# Patient Record
Sex: FEMALE | Race: ASIAN | NOT HISPANIC OR LATINO | URBAN - METROPOLITAN AREA
[De-identification: names, ages, dates, MRNs, and addresses within clinical notes are randomized per-mention and may not be internally consistent; named-entity substitution may affect disease eponyms.]

---

## 2020-09-20 ENCOUNTER — EMERGENCY (EMERGENCY)
Facility: HOSPITAL | Age: 30
LOS: 0 days | Discharge: HOME | End: 2020-09-20
Attending: EMERGENCY MEDICINE | Admitting: EMERGENCY MEDICINE
Payer: MEDICARE

## 2020-09-20 VITALS
TEMPERATURE: 98 F | RESPIRATION RATE: 19 BRPM | WEIGHT: 169.98 LBS | HEART RATE: 86 BPM | DIASTOLIC BLOOD PRESSURE: 82 MMHG | SYSTOLIC BLOOD PRESSURE: 110 MMHG | OXYGEN SATURATION: 100 %

## 2020-09-20 VITALS
OXYGEN SATURATION: 100 % | RESPIRATION RATE: 18 BRPM | SYSTOLIC BLOOD PRESSURE: 123 MMHG | HEART RATE: 76 BPM | TEMPERATURE: 98 F | DIASTOLIC BLOOD PRESSURE: 74 MMHG

## 2020-09-20 DIAGNOSIS — M25.519 PAIN IN UNSPECIFIED SHOULDER: ICD-10-CM

## 2020-09-20 DIAGNOSIS — M62.830 MUSCLE SPASM OF BACK: ICD-10-CM

## 2020-09-20 DIAGNOSIS — M54.9 DORSALGIA, UNSPECIFIED: ICD-10-CM

## 2020-09-20 PROCEDURE — 73030 X-RAY EXAM OF SHOULDER: CPT | Mod: 26,LT

## 2020-09-20 PROCEDURE — 71046 X-RAY EXAM CHEST 2 VIEWS: CPT | Mod: 26

## 2020-09-20 PROCEDURE — 99284 EMERGENCY DEPT VISIT MOD MDM: CPT

## 2020-09-20 RX ORDER — METHOCARBAMOL 500 MG/1
2 TABLET, FILM COATED ORAL
Qty: 24 | Refills: 0
Start: 2020-09-20 | End: 2020-09-23

## 2020-09-20 RX ORDER — KETOROLAC TROMETHAMINE 30 MG/ML
30 SYRINGE (ML) INJECTION ONCE
Refills: 0 | Status: DISCONTINUED | OUTPATIENT
Start: 2020-09-20 | End: 2020-09-20

## 2020-09-20 RX ORDER — OXYCODONE AND ACETAMINOPHEN 5; 325 MG/1; MG/1
1 TABLET ORAL ONCE
Refills: 0 | Status: DISCONTINUED | OUTPATIENT
Start: 2020-09-20 | End: 2020-09-20

## 2020-09-20 RX ORDER — METHOCARBAMOL 500 MG/1
1000 TABLET, FILM COATED ORAL ONCE
Refills: 0 | Status: COMPLETED | OUTPATIENT
Start: 2020-09-20 | End: 2020-09-20

## 2020-09-20 RX ADMIN — OXYCODONE AND ACETAMINOPHEN 1 TABLET(S): 5; 325 TABLET ORAL at 12:59

## 2020-09-20 RX ADMIN — METHOCARBAMOL 1000 MILLIGRAM(S): 500 TABLET, FILM COATED ORAL at 11:14

## 2020-09-20 RX ADMIN — Medication 30 MILLIGRAM(S): at 11:45

## 2020-09-20 RX ADMIN — Medication 30 MILLIGRAM(S): at 11:14

## 2020-09-20 NOTE — ED PROVIDER NOTE - NSFOLLOWUPINSTRUCTIONS_ED_ALL_ED_FT
Muscle Cramps and Spasms    Muscle cramps and spasms occur when a muscle or muscles tighten and you have no control over this tightening (involuntary muscle contraction). They are a common problem and can develop in any muscle. The most common place is in the calf muscles of the leg. Both muscle cramps and muscle spasms are involuntary muscle contractions, but they also have differences:     Muscle cramps are sporadic and painful. They may last a few seconds to a quarter of an hour. Muscle cramps are often more forceful and last longer than muscle spasms.  Muscle spasms may or may not be painful. They may also last just a few seconds or much longer.     CAUSES  It is uncommon for cramps or spasms to be due to a serious underlying problem. In many cases, the cause of cramps or spasms is unknown. Some common causes are:     Overexertion.    Overuse from repetitive motions (doing the same thing over and over).    Remaining in a certain position for a long period of time.    Improper preparation, form, or technique while performing a sport or activity.    Dehydration.    Injury.    Side effects of some medicines.    Abnormally low levels of the salts and ions in your blood (electrolytes), especially potassium and calcium. This could happen if you are taking water pills (diuretics) or you are pregnant.       Some underlying medical problems can make it more likely to develop cramps or spasms. These include, but are not limited to:     Diabetes.    Parkinson disease.    Hormone disorders, such as thyroid problems.    Alcohol abuse.    Diseases specific to muscles, joints, and bones.    Blood vessel disease where not enough blood is getting to the muscles.       HOME CARE INSTRUCTIONS  Stay well hydrated. Drink enough water and fluids to keep your urine clear or pale yellow.  It may be helpful to massage, stretch, and relax the affected muscle.  For tight or tense muscles, use a warm towel, heating pad, or hot shower water directed to the affected area.  If you are sore or have pain after a cramp or spasm, applying ice to the affected area may relieve discomfort.  Put ice in a plastic bag.  Place a towel between your skin and the bag.  Leave the ice on for _____________________ minutes, _____________________ times a day.  Medicines used to treat a known cause of cramps or spasms may help reduce their frequency or severity. Only take over-the-counter or prescription medicines as directed by your caregiver.     SEEK MEDICAL CARE IF:  Your cramps or spasms get more severe, more frequent, or do not improve over time.     MAKE SURE YOU:  Understand these instructions.   Will watch your condition.  Will get help right away if you are not doing well or get worse.    ADDITIONAL NOTES AND INSTRUCTIONS    Please follow up with your Primary MD in 24-48 hr.  Seek immediate medical care for any new/worsening signs or symptoms.     Follow up with your primary medical doctor in 1-2 days

## 2020-09-20 NOTE — ED PROVIDER NOTE - CARE PROVIDER_API CALL
Calin Means  ORTHOPAEDIC SURGERY  [address] [place] [address]  Phone: [phone]  Fax: [phone]  Follow Up Time: 1-3 Days

## 2020-09-20 NOTE — ED PROVIDER NOTE - NSFOLLOWUPCLINICS_GEN_ALL_ED_FT
[facility] ([facility])  Rehabilitation  [address] [place] [address]  Phone: [phone]  Fax:   Follow Up Time: 1-3 Days

## 2020-09-20 NOTE — ED PROVIDER NOTE - PATIENT PORTAL LINK FT
You can access the FollowMyHealth Patient Portal offered by [facility] by registering at the following website: [email or web address]. By joining [employer]’s FollowMyHealth portal, you will also be able to view your health information using other applications (apps) compatible with our system.

## 2020-09-20 NOTE — ED PROVIDER NOTE - CLINICAL SUMMARY MEDICAL DECISION MAKING FREE TEXT BOX
Left shoulder strain. Left shoulder strain. x-ray neg. Advised rest. Pt is a  and is doing tele teaching. Naprosyn BID advised.

## 2020-09-20 NOTE — ED PROVIDER NOTE - PHYSICAL EXAMINATION
CONST: Well appearing in NAD  EYES:  Sclera and conjunctiva clear.  NECK: Non-tender, no meningeal signs, supple  CARD: Normal S1 S2; Normal rate and rhythm  RESP: Equal BS B/L, No wheezes, rhonchi or rales. No distress  GI: Soft, non-tender, non-distended.  MS: mild TTP L trapezius,  + spasm, no pain with ROM L shoulder, Normal ROM in all extremities. No edema of lower extremities, no calf pain, radial pulses 2+ bilaterally  SKIN: Warm, dry, no acute rashes. Good turgor  NEURO: A&Ox3, No focal deficits. Strength 5/5 with no sensory deficits. Steady gait

## 2020-09-20 NOTE — ED PROVIDER NOTE - ATTENDING CONTRIBUTION TO CARE
Pt reports she woke up with pain to the left shoulder area. Pain on movement. No injury. On exam S1S2 rrr, lungs clear, no rash of back or shoulder, + tenderness left shoulder, trapezius, parascapular area. Pain on range of motion.

## 2020-09-20 NOTE — ED PROVIDER NOTE - NS ED ROS FT
CONST: No fever, chills or bodyaches  EYES: No pain, redness, drainage or visual changes.  ENT: No ear pain or discharge, nasal discharge or congestion. No sore throat  CARD: No chest pain, palpitations  RESP: No SOB, cough  GI: No abdominal pain, N/V/D  MS: + back pain. No joint pain, or extremity pain/injury  SKIN: No rashes  NEURO: No headache, dizziness, paresthesias

## 2020-09-20 NOTE — ED PROVIDER NOTE - OBJECTIVE STATEMENT
30 y.o female w/ no sig pmx presents to the ED for evaluation of muscle spasm.  States she turned wrong in bed and developed acute pain L trapezius muscle pain 30 y.o female w/ no sig pmx presents to the ED for evaluation of muscle spasm.  States she turned wrong in bed and developed acute pain L trapezius muscle pain.  Acute, throbbing, worse w/ movement of L arm, mild severity, no radiation of pain.  No chest pain, dyspnea, fever, chills, cough, paresthesias.

## 2020-09-20 NOTE — ED ADULT NURSE NOTE - NSIMPLEMENTINTERV_GEN_ALL_ED
Implemented All Universal Safety Interventions:  [place] to call system. Call bell, personal items and telephone within reach. Instruct patient to call for assistance. Room bathroom lighting operational. Non-slip footwear when patient is off stretcher. Physically safe environment: no spills, clutter or unnecessary equipment. Stretcher in lowest position, wheels locked, appropriate side rails in place.

## 2022-03-22 ENCOUNTER — EMERGENCY (EMERGENCY)
Facility: HOSPITAL | Age: 32
LOS: 0 days | Discharge: HOME | End: 2022-03-22
Attending: EMERGENCY MEDICINE | Admitting: EMERGENCY MEDICINE
Payer: MEDICARE

## 2022-03-22 VITALS
RESPIRATION RATE: 18 BRPM | OXYGEN SATURATION: 99 % | TEMPERATURE: 97 F | HEART RATE: 100 BPM | DIASTOLIC BLOOD PRESSURE: 83 MMHG | SYSTOLIC BLOOD PRESSURE: 139 MMHG

## 2022-03-22 VITALS — WEIGHT: 175.05 LBS

## 2022-03-22 DIAGNOSIS — M79.672 PAIN IN LEFT FOOT: ICD-10-CM

## 2022-03-22 PROCEDURE — 73630 X-RAY EXAM OF FOOT: CPT | Mod: 26,LT

## 2022-03-22 PROCEDURE — 99283 EMERGENCY DEPT VISIT LOW MDM: CPT

## 2022-03-22 RX ORDER — IBUPROFEN 200 MG
1 TABLET ORAL
Qty: 12 | Refills: 0
Start: 2022-03-22 | End: 2022-03-24

## 2022-03-22 RX ORDER — IBUPROFEN 200 MG
600 TABLET ORAL ONCE
Refills: 0 | Status: COMPLETED | OUTPATIENT
Start: 2022-03-22 | End: 2022-03-22

## 2022-03-22 RX ADMIN — Medication 600 MILLIGRAM(S): at 18:23

## 2022-03-22 NOTE — ED PROVIDER NOTE - PHYSICAL EXAMINATION
CONSTITUTIONAL: Well-developed; well-nourished; in no acute distress, nontoxic appearing  SKIN: skin exam is warm and dry  CARD: S1, S2 normal, no murmur  RESP: No wheezes, rales or rhonchi. Good air movement bilaterally   EXT: Normal ROM. No bony tenderness or deformity. no skin changes. pulses 2+. steady gait.   NEURO: awake, alert, following commands, oriented, grossly unremarkable. No Focal deficits. GCS 15.   PSYCH: Cooperative, appropriate.

## 2022-03-22 NOTE — ED PROVIDER NOTE - PROVIDER TOKENS
FREE:[LAST:[[facility]],PHONE:[phone]],FAX:[(   [phone]    -],ADDRESS:[[address] [place] [address]],FOLLOWUP:[1-3 Days]]

## 2022-03-22 NOTE — ED PROVIDER NOTE - NS ED ATTENDING STATEMENT MOD
This was a shared visit with the ONI. I reviewed and verified the documentation and independently performed the documented:

## 2022-03-22 NOTE — ED PROVIDER NOTE - CLINICAL SUMMARY MEDICAL DECISION MAKING FREE TEXT BOX
x ray obtained.  no fracture.  pt to cont NSAIDs.  Rec ice.  Pt to keep appt she has with Podiatrist this week. Pt instructed to return if any worsening symptoms or concerns.  They verbalize understanding.

## 2022-03-22 NOTE — ED PROVIDER NOTE - NS ED ROS FT
Review of Systems:  	•	CONSTITUTIONAL: no fever, no diaphoresis, no chills  	•	SKIN: no rash  	•	MUSCULOSKELETAL: +L foot pain, no swelling/redness  	•	NEUROLOGIC: no weakness, no numbness  	•	PSYCH: no anxiety, non suicidal, non homicidal, no hallucination, no depression

## 2022-03-22 NOTE — ED PROVIDER NOTE - ATTENDING CONTRIBUTION TO CARE
32 yo F presents for evaluation of left foot pain worse with ambulation x 1 week.  Pt seen by [facility] and started on Mobic and steroids.  Pt completed steroids and pain is back. no h/o trauma. On exam pt in NAD AAO x 3, + tender left plantar surface, no skin changes, ankle stable,

## 2022-03-22 NOTE — ED PROVIDER NOTE - NSFOLLOWUPINSTRUCTIONS_ED_ALL_ED_FT
Foot Pain    Many things can cause foot pain. Some common causes are:     An injury.  A sprain.  Arthritis.  Blisters.  Bunions.    HOME CARE INSTRUCTIONS  Pay attention to any changes in your symptoms. Take these actions to help with your discomfort:    If directed, put ice on the affected area:  Put ice in a plastic bag.  Place a towel between your skin and the bag.  Leave the ice on for 15–20 minutes, 3?4 times a day for 2 days.  Take over-the-counter and prescription medicines only as told by your health care provider.  Wear comfortable, supportive shoes that fit you well. Do not wear high heels.  Do not stand or walk for long periods of time.  Do not lift a lot of weight. This can put added pressure on your feet.  Do stretches to relieve foot pain and stiffness as told by your health care provider.  Rub your foot gently.  Keep your feet clean and dry.    SEEK MEDICAL CARE IF:  Your pain does not get better after a few days of self-care.  Your pain gets worse.  You cannot stand on your foot.    SEEK IMMEDIATE MEDICAL CARE IF:  Your foot is numb or tingling.  Your foot or toes are swollen.  Your foot or toes turn white or blue.  You have warmth and redness along your foot.    ADDITIONAL NOTES AND INSTRUCTIONS    Please follow up with your Primary MD in 24-48 hr.  Seek immediate medical care for any new/worsening signs or symptoms. OVERVIEW  Plantar fasciitis is one of the most common causes of foot pain in adults. Plantar fasciitis is caused by a strain of the ligaments in an area of the foot called the plantar fascia (figure 1). The plantar fascia (pronounced FASH-uh) is a thick piece of tissue with long fibers that starts at the heel bone and fans out along the under surface of the foot to the toes. The fascia provides support as the toes bear the body's weight when the heel rises during walking. Running, jumping, or standing for long periods of time can strain the plantar fascia.    Most people with plantar fasciitis notice improvement in symptoms with or without conservative treatment such as rest, icing, and stretching. Most people are pain-free within a year.    SYMPTOMS  The most common symptom of plantar fasciitis is pain beneath the heel and sole of the foot. The pain is often worst when stepping onto the foot, particularly when first getting out of bed in the morning or when getting up after being seated for some time. You may have pain in one or both of your feet.    RISK FACTORS  Plantar fasciitis is more likely to occur in people whose lifestyle or occupation causes repetitive impact to the heel. Activities such as running, marching, or dancing may trigger or worsen symptoms. Possible other factors that increase the risk of plantar fasciitis include obesity, prolonged standing, and limited ankle flexibility.    Plantar fasciitis occurs more frequently among runners. Although evidence is limited, possible factors that increase the risk in this group include:    ?Excessive training (particularly a sudden increase in the distance run)    ?Improper running shoes    ?Running on unyielding surfaces    ?Prolonged standing or walking on hard surfaces    ?Flat feet    ?High arches    Plantar fasciitis usually occurs in people without underlying medical problems, although it is more common in people with type 2 diabetes, and it can be associated with other rheumatic disorders such as ankylosing spondylitis or psoriatic arthritis. (See "Patient education: Axial spondyloarthritis, including ankylosing spondylitis (Beyond the Basics)" and "Patient education: Psoriatic arthritis (Beyond the Basics)".)    DIAGNOSIS  To diagnose plantar fasciitis, a health care provider will take a medical history and examine your feet to locate painful areas. This involves holding your foot in a flexed position with one hand, and using the other hand to press on different parts of your sole (where the plantar fascia is located). It is important to tell your provider if you have noticed pain or tenderness in other areas not found during the exam.    If you have typical symptoms of plantar fasciitis, then no X-rays, ultrasound, or other tests are required. This is the case for most people. In some instances, depending upon the nature and severity of pain as well as other individual factors, your provider may recommend X-rays to determine if another issue (such as a fracture) is causing your pain.    TREATMENT    Initial treatment options — Many people with plantar fasciitis require no specific treatment as the condition gets better on its own. While some people might need treatment to manage pain, many commonly used treatments have not been proven to improve the symptoms of plantar fasciitis.    Commonly used treatments for plantar fasciitis include the following:    Rest — Limiting athletic activities and getting extra rest may help to relieve your symptoms. If possible, avoid excessive and repetitive heel impact from jumping, dancing, and distance running. A complete lack of physical activity, however, is not recommended, as this can lead to stiffening and a return of pain.    Icing — Applying ice to the area, for example for 20 minutes up to four times daily, may relieve pain. Ice and massage may also be used before exercise, but their value is unknown.    Stretching — Stretching exercises may be helpful. Home exercises include the calf-plantar fascia stretch (picture 1), foot/ankle circles (picture 2), toe curls (picture 3), and toe towel curls (picture 4). Go slowly and be careful when you start new exercises to avoid causing more pain.    Pain medication — A clinician may recommend a short course of a nonsteroidal antiinflammatory drug (NSAID) such as ibuprofen (sample brand names: Advil, Motrin) or naproxen (sample brand name: Aleve) to relieve pain. However, any benefit might be small, and these medications have many possible side effects. For these reasons, it is important to weigh the potential risks and benefits. Topical NSAIDs (such as creams or gels) applied to the painful area may also be helpful but have not been studied for this condition. (See "Patient education: Nonsteroidal antiinflammatory drugs (NSAIDs) (Beyond the Basics)".)    Protective footwear and orthotics — It may help to wear athletic shoes, arch-supporting shoes (particularly those with an extra-long "counter," which is the firm part of the shoe that surrounds the heel), or shoes with rigid shanks (usually a metal insert in the sole of the shoe). Cushion-soled shoes with gel pad inserts or heel cups may provide temporary pain relief. Silicone inserts have been found to provide better support than felt pads or rubber heel cups. Magnetic insoles have not been found to provide any additional benefit. There may be no difference in benefit between premade orthotics (which can be bought in a pharmacy) and those that are specially made (customized), although the latter are more expensive. There is also growing evidence that suggests that all forms of foot orthoses provide little or no benefit.    People who work or live in buildings with concrete floors should wear shoes with extra cushioning.    Wearing slippers or going barefoot may cause your symptoms to get worse or return, even if your floors are carpeted. Thus, it's a good idea to put on a supportive shoe or sandal even before you step out of bed in the morning.    Tape support — Taping the affected foot with a technique known as "low-Dye taping" may help, particularly if you tend to have pain first thing in the morning. Four strips of tape are applied as illustrated in the figure (picture 5). Do not wrap the tape too tightly. You can use hypoallergenic tape if you have an allergy or sensitivity to regular sports tape.    Other plantar fasciitis treatment options — If these measures fail to improve the pain, your health care provider may recommend trying one of the following:    Steroid injection — An injection of a steroid (also called "glucocorticoid") medication in your foot can help to relieve pain quickly, although the effect may wear off after a few weeks. The doctor will press on your foot in order to locate the tender area and give the injection in that spot. The injection can be repeated if needed when the effects wear off, although many clinicians limit the number of times they will give injections because they believe repeated injections may weaken the tissues of the sole of the foot. However, this belief is unproven.    Steroid injections can be painful and are associated with a very small risk of causing infection.    Casting — Another option is a short walking cast, which begins at the calf and covers the ankle and foot up to the toes. This type of cast has a rocker-shaped bottom that allows you to continue walking while wearing it. This treatment has not been tested in clinical trials.    Surgery — Surgery is rarely required for people with plantar fasciitis, and its efficacy remains unproven. It would only be recommended if all other treatments had failed and there were persistent and disabling symptoms for at least 6 to 12 months. Surgery involves detaching the plantar fascia from the heel bone.    Treatments of no or unproven benefit — Some clinicians recommend other approaches to people who do not improve with the above measures. These approaches are typically more costly. Shockwave therapy and platelet-rich plasma injections have been studied more extensively than some of the other alternative options.    Shockwave therapy — This therapy involves using a special probe to generate sound waves that provide a burst of energy to the sole of the foot. High-quality studies have demonstrated that shockwave therapy provides no benefits over "placebo" treatment (ie, pretend treatment that doesn't really deliver a dose of energy, or delivers a dose too low to have an effect).    Whole blood or platelet-rich plasma injection — This therapy involves injecting a person's own blood, or just the platelet component of it, into the sole of the foot. Low-quality studies indicate these therapies are unlikely to be of benefit; this is consistent with a demonstrated lack of benefit for other similar conditions such as tennis elbow.    WHERE TO GET MORE INFORMATION  Your health care provider is the best source of information for questions and concerns related to your medical problem.    This article will be updated as needed on our web site ([email or web address]). Related topics for patients, as well as selected articles written for health care professionals, are also available. Some of the most relevant are listed below.    Patient level information — [employer] offers two types of patient education materials.    The Basics — The Basics patient education pieces answer the four or five key questions a patient might have about a given condition. These articles are best for patients who want a general overview and who prefer short, easy-to-read materials.    Patient education: Heel pain caused by plantar fasciitis (The Basics)  Patient education: Metatarsalgia (The Basics)    Beyond the Basics — Beyond the Basics patient education pieces are longer, more sophisticated, and more detailed. These articles are best for patients who want in-depth information and are comfortable with some medical jargon.    Patient education: Axial spondyloarthritis, including ankylosing spondylitis (Beyond the Basics)  Patient education: Psoriatic arthritis (Beyond the Basics)  Patient education: Nonsteroidal antiinflammatory drugs (NSAIDs) (Beyond the Basics)    Professional level information — Professional level articles are designed to keep doctors and other health professionals up-to-date on the latest medical findings. These articles are thorough, long, and complex, and they contain multiple references to the research on which they are based. Professional level articles are best for people who are comfortable with a lot of medical terminology and who want to read the same materials their doctors are reading.    Heel pain in the active child or skeletally immature adolescent: Overview of causes  Foot and ankle pain in the active child or skeletally immature adolescent: Evaluation  Overview of running injuries of the lower extremity  Plantar fasciitis  Clinical manifestations and diagnosis of peripheral spondyloarthritis in adults      Foot Pain    Many things can cause foot pain. Some common causes are:     An injury.  A sprain.  Arthritis.  Blisters.  Bunions.    HOME CARE INSTRUCTIONS  Pay attention to any changes in your symptoms. Take these actions to help with your discomfort:    If directed, put ice on the affected area:  Put ice in a plastic bag.  Place a towel between your skin and the bag.  Leave the ice on for 15–20 minutes, 3?4 times a day for 2 days.  Take over-the-counter and prescription medicines only as told by your health care provider.  Wear comfortable, supportive shoes that fit you well. Do not wear high heels.  Do not stand or walk for long periods of time.  Do not lift a lot of weight. This can put added pressure on your feet.  Do stretches to relieve foot pain and stiffness as told by your health care provider.  Rub your foot gently.  Keep your feet clean and dry.    SEEK MEDICAL CARE IF:  Your pain does not get better after a few days of self-care.  Your pain gets worse.  You cannot stand on your foot.    SEEK IMMEDIATE MEDICAL CARE IF:  Your foot is numb or tingling.  Your foot or toes are swollen.  Your foot or toes turn white or blue.  You have warmth and redness along your foot.    ADDITIONAL NOTES AND INSTRUCTIONS    Please follow up with your Primary MD in 24-48 hr.  Seek immediate medical care for any new/worsening signs or symptoms.

## 2022-03-22 NOTE — ED PROVIDER NOTE - PATIENT PORTAL LINK FT
You can access the FollowMyHealth Patient Portal offered by [facility] by registering at the following website: [email or web address]. By joining [employer]s FollowMyHealth portal, you will also be able to view your health information using other applications (apps) compatible with our system.

## 2022-03-22 NOTE — ED PROVIDER NOTE - OBJECTIVE STATEMENT
31 year old female, no past medical history, who presents with foot pain. patient endorses gradual onset of plantar aspect of L foot, worse with ambulation. pain described as soreness/burning, intermittent, non-radiating. denies falls/trauma. no knee/hip pain, back pain, no skin changes.

## 2022-03-22 NOTE — ED PROVIDER NOTE - CARE PROVIDER_API CALL
[facility],   [address] [place] [id number]  Phone: [phone]  Fax: (   )    -  Follow Up Time: 1-3 Days

## 2022-03-23 PROBLEM — Z78.9 OTHER SPECIFIED HEALTH STATUS: Chronic | Status: ACTIVE | Noted: 2020-09-20
